# Patient Record
Sex: MALE | Race: WHITE | Employment: FULL TIME | URBAN - METROPOLITAN AREA
[De-identification: names, ages, dates, MRNs, and addresses within clinical notes are randomized per-mention and may not be internally consistent; named-entity substitution may affect disease eponyms.]

---

## 2018-04-16 ENCOUNTER — HOSPITAL ENCOUNTER (OUTPATIENT)
Age: 53
Discharge: HOME | End: 2018-04-18
Payer: COMMERCIAL

## 2018-04-16 PROCEDURE — 88112 CYTOPATH CELL ENHANCE TECH: CPT

## 2020-01-07 RX ORDER — PREDNISOLONE ACETATE 10 MG/ML
1 SUSPENSION/ DROPS OPHTHALMIC 2 TIMES DAILY
Status: CANCELLED | OUTPATIENT
Start: 2020-01-07 | End: 2020-01-14

## 2020-01-07 RX ORDER — PROPARACAINE HYDROCHLORIDE 5 MG/ML
1 SOLUTION/ DROPS OPHTHALMIC ONCE
Status: CANCELLED | OUTPATIENT
Start: 2020-01-07 | End: 2020-01-07

## 2020-01-09 ENCOUNTER — HOSPITAL ENCOUNTER (OUTPATIENT)
Age: 55
Setting detail: OUTPATIENT SURGERY
Discharge: HOME | End: 2020-01-09
Attending: OPHTHALMOLOGY | Admitting: OPHTHALMOLOGY
Payer: COMMERCIAL

## 2020-01-09 PROCEDURE — 2709999900 HC NON-CHARGEABLE SUPPLY: Performed by: OPHTHALMOLOGY

## 2020-01-09 PROCEDURE — 3600000001 HC SURGERY LEVEL 1  BASE: Performed by: OPHTHALMOLOGY

## 2020-01-09 PROCEDURE — 2500000003 HC RX 250 WO HCPCS

## 2020-01-09 PROCEDURE — 3600000011 HC SURGERY LEVEL 1  ADDTL 15MIN: Performed by: OPHTHALMOLOGY

## 2020-01-09 PROCEDURE — 6370000000 HC RX 637 (ALT 250 FOR IP): Performed by: OPHTHALMOLOGY

## 2020-01-09 PROCEDURE — 6370000000 HC RX 637 (ALT 250 FOR IP)

## 2020-01-09 PROCEDURE — 2500000003 HC RX 250 WO HCPCS: Performed by: OPHTHALMOLOGY

## 2020-01-09 RX ORDER — SODIUM CHLORIDE, POTASSIUM CHLORIDE, CALCIUM CHLORIDE, MAGNESIUM CHLORIDE, SODIUM ACETATE, AND SODIUM CITRATE 6.4; .75; .48; .3; 3.9; 1.7 MG/ML; MG/ML; MG/ML; MG/ML; MG/ML; MG/ML
SOLUTION IRRIGATION PRN
Status: DISCONTINUED | OUTPATIENT
Start: 2020-01-09 | End: 2020-01-09 | Stop reason: ALTCHOICE

## 2020-01-09 RX ORDER — PROPARACAINE HYDROCHLORIDE 5 MG/ML
SOLUTION/ DROPS OPHTHALMIC PRN
Status: DISCONTINUED | OUTPATIENT
Start: 2020-01-09 | End: 2020-01-09 | Stop reason: ALTCHOICE

## 2020-01-09 RX ORDER — PILOCARPINE HYDROCHLORIDE 10 MG/ML
SOLUTION/ DROPS OPHTHALMIC PRN
Status: DISCONTINUED | OUTPATIENT
Start: 2020-01-09 | End: 2020-01-09 | Stop reason: ALTCHOICE

## 2020-01-09 RX ORDER — PREDNISOLONE ACETATE 10 MG/ML
SUSPENSION/ DROPS OPHTHALMIC PRN
Status: DISCONTINUED | OUTPATIENT
Start: 2020-01-09 | End: 2020-01-09 | Stop reason: ALTCHOICE

## 2020-01-09 NOTE — H&P
David Gomez  YOB: 1965    [id number]      Re:   H&P AGE:   62 y.o. SEX:   male      DATE OF PROCEDURE:   1/9/2020    SURGEON:   Scarlet Troy M.D. Chief complaint-  Narrow angles found during ophthalmic exam     History of present illness: The patient is a 62 y.o. male with occludable  narrow Angle anatomy at high risk for narrow angle glaucoma. Patient HAS  had epissodes of visual blurring and pain. Patient is oriented to person, place, time, and general circumstances. Past Medical History:   Diagnosis Date    Hyperlipidemia     Hypertension      Past Surgical History:   Procedure Laterality Date    BACK SURGERY      SHOULDER SURGERY       No Known Allergies  Review of Systems - reviewed with patient and reviewed in chart  Medications reviewed in chart  Review of Systems - reviewed with patient and reviewed in chart    Past Ocular history: Narrow angle anatomy  A complete slit lamp exam was performed. The patient had iris corneal touch Nasal   and temporally. Angle grade is 1 in  right eye      Assessment:    Narrow angle anatomy:  occludable and at risk for narrow angle glaucoma.   Plan:    Iridotomy  right eye       Raegan Bran M.D.

## 2020-01-09 NOTE — OP NOTE
[address]  YOB: 1965    [id number]      Re:   OPERATIVE REPORT  AGE:   62 y.o. SEX:   male      DATE OF PROCEDURE:   1/9/2020    SURGEON:   Gerhardt Seton, M.D. Marea Seat:  None    PREOPERATIVE DIAGNOSIS:  Narrow Angle Anatomy , right eye    POSTOPERATIVE DIAGNOSIS:  Narrow Angle Anatomy, right eye    OPERATION: Iridotomy     ANESTHESIA:  Topical.    ESTIMATED BLOOD LOSS:  None    COMPLICATIONS:  None    SPECIMEN:  None    PROCEDURE:  Pilocarpine was place pre- laser procedure approximately 30 minutes prior to procedure. The eye was anesthetized with Proparacaine 5 minutes prior to laser. Donelda Leaver Gonioscopic lens was placed on the operative eye. A slit lamp was used with multiple argon settings to generate a open iridotomy  At 11   oclock. Argon:  360W, 200ms, 200 spot size,    # of exposures 3    1.3W, 10ms,    50 spot size          # of exposures  81  Yag       1.5mj  # of exposures 5     Procedure tolerated well with no complications. Ang Delacruz M.D.

## 2020-01-18 RX ORDER — PREDNISOLONE ACETATE 10 MG/ML
1 SUSPENSION/ DROPS OPHTHALMIC 2 TIMES DAILY
Status: CANCELLED | OUTPATIENT
Start: 2020-01-18 | End: 2020-01-25

## 2020-01-18 RX ORDER — PROPARACAINE HYDROCHLORIDE 5 MG/ML
1 SOLUTION/ DROPS OPHTHALMIC ONCE
Status: CANCELLED | OUTPATIENT
Start: 2020-01-18 | End: 2020-01-18

## 2020-01-23 ENCOUNTER — HOSPITAL ENCOUNTER (OUTPATIENT)
Age: 55
Setting detail: OUTPATIENT SURGERY
Discharge: HOME | End: 2020-01-23
Attending: OPHTHALMOLOGY | Admitting: OPHTHALMOLOGY
Payer: COMMERCIAL

## 2020-01-23 PROBLEM — H40.033 ANATOMICAL NARROW ANGLE, BILATERAL: Status: ACTIVE | Noted: 2020-01-23

## 2020-01-23 PROCEDURE — 2500000003 HC RX 250 WO HCPCS: Performed by: OPHTHALMOLOGY

## 2020-01-23 PROCEDURE — 6370000000 HC RX 637 (ALT 250 FOR IP): Performed by: OPHTHALMOLOGY

## 2020-01-23 PROCEDURE — 2709999900 HC NON-CHARGEABLE SUPPLY: Performed by: OPHTHALMOLOGY

## 2020-01-23 PROCEDURE — 6370000000 HC RX 637 (ALT 250 FOR IP)

## 2020-01-23 PROCEDURE — 2500000003 HC RX 250 WO HCPCS

## 2020-01-23 PROCEDURE — 3600000001 HC SURGERY LEVEL 1  BASE: Performed by: OPHTHALMOLOGY

## 2020-01-23 RX ORDER — PROPARACAINE HYDROCHLORIDE 5 MG/ML
SOLUTION/ DROPS OPHTHALMIC PRN
Status: DISCONTINUED | OUTPATIENT
Start: 2020-01-23 | End: 2020-01-23 | Stop reason: ALTCHOICE

## 2020-01-23 RX ORDER — PREDNISOLONE ACETATE 10 MG/ML
SUSPENSION/ DROPS OPHTHALMIC PRN
Status: DISCONTINUED | OUTPATIENT
Start: 2020-01-23 | End: 2020-01-23 | Stop reason: ALTCHOICE

## 2020-01-23 RX ORDER — SODIUM CHLORIDE, POTASSIUM CHLORIDE, CALCIUM CHLORIDE, MAGNESIUM CHLORIDE, SODIUM ACETATE, AND SODIUM CITRATE 6.4; .75; .48; .3; 3.9; 1.7 MG/ML; MG/ML; MG/ML; MG/ML; MG/ML; MG/ML
SOLUTION IRRIGATION PRN
Status: DISCONTINUED | OUTPATIENT
Start: 2020-01-23 | End: 2020-01-23 | Stop reason: ALTCHOICE

## 2020-01-23 RX ORDER — PILOCARPINE HYDROCHLORIDE 10 MG/ML
SOLUTION/ DROPS OPHTHALMIC PRN
Status: DISCONTINUED | OUTPATIENT
Start: 2020-01-23 | End: 2020-01-23 | Stop reason: ALTCHOICE

## 2020-01-23 NOTE — H&P
David Mauro  YOB: 1965    [id number]      Re:   H&P AGE:   62 y.o. SEX:   male      DATE OF PROCEDURE:   1/23/2020    SURGEON:   Polly Benavidez M.D. Chief complaint-  Narrow angles found during ophthalmic exam     History of present illness: The patient is a 62 y.o. male with occludable  narrow Angle anatomy at high risk for narrow angle glaucoma. Patient HAS  had epissodes of visual blurring and pain. Patient is oriented to person, place, time, and general circumstances. Past Medical History:   Diagnosis Date    Hyperlipidemia     Hypertension      Past Surgical History:   Procedure Laterality Date    BACK SURGERY      REFRACTIVE SURGERY Right 1/9/2020    RIGHT EYE IRIDECTOMY performed by Polly Benavidez MD at [address]       No Known Allergies  Review of Systems - reviewed with patient and reviewed in chart  Medications reviewed in chart  Review of Systems - reviewed with patient and reviewed in chart    Past Ocular history: Narrow angle anatomy  A complete slit lamp exam was performed. The patient had iris corneal touch Nasal   and temporally. Angle grade is 1 in  left eye      Assessment:    Narrow angle anatomy:  occludable and at risk for narrow angle glaucoma.   Plan:    Iridotomy  left eye       Gina Carey M.D.

## 2020-05-19 ENCOUNTER — HOSPITAL ENCOUNTER (OUTPATIENT)
Dept: GENERAL RADIOLOGY | Age: 55
Discharge: HOME | End: 2020-05-21
Payer: COMMERCIAL

## 2020-05-19 PROCEDURE — A4641 RADIOPHARM DX AGENT NOC: HCPCS | Performed by: RADIOLOGY

## 2020-05-19 PROCEDURE — 2500000003 HC RX 250 WO HCPCS: Performed by: RADIOLOGY

## 2020-05-19 PROCEDURE — 6370000000 HC RX 637 (ALT 250 FOR IP): Performed by: RADIOLOGY

## 2020-05-19 PROCEDURE — 74240 X-RAY XM UPR GI TRC 1CNTRST: CPT

## 2020-05-19 PROCEDURE — 6360000004 HC RX CONTRAST MEDICATION: Performed by: RADIOLOGY

## 2020-05-19 RX ADMIN — BARIUM SULFATE 176 G: 960 POWDER, FOR SUSPENSION ORAL at 09:42

## 2020-05-19 RX ADMIN — BARIUM SULFATE 140 ML: 980 POWDER, FOR SUSPENSION ORAL at 09:43

## 2020-05-19 RX ADMIN — BARIUM SULFATE 1 TABLET: 700 TABLET ORAL at 09:50

## 2020-05-19 RX ADMIN — ANTACID/ANTIFLATULENT 1 EACH: 380; 550; 10; 10 GRANULE, EFFERVESCENT ORAL at 09:43

## 2020-07-17 ENCOUNTER — HOSPITAL ENCOUNTER (OUTPATIENT)
Age: 55
Discharge: HOME | End: 2020-07-19

## 2020-07-17 PROCEDURE — 88305 TISSUE EXAM BY PATHOLOGIST: CPT

## (undated) DEVICE — SOLUTION IRRIGATION BAL SALT SOLUTION 15 ML STRL BSS